# Patient Record
Sex: MALE | Race: WHITE | NOT HISPANIC OR LATINO | Employment: FULL TIME | URBAN - METROPOLITAN AREA
[De-identification: names, ages, dates, MRNs, and addresses within clinical notes are randomized per-mention and may not be internally consistent; named-entity substitution may affect disease eponyms.]

---
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
## 2023-07-19 ENCOUNTER — HOSPITAL ENCOUNTER (EMERGENCY)
Facility: CLINIC | Age: 23
Discharge: HOME | End: 2023-07-19
Payer: COMMERCIAL

## 2023-07-19 VITALS
SYSTOLIC BLOOD PRESSURE: 136 MMHG | DIASTOLIC BLOOD PRESSURE: 74 MMHG | TEMPERATURE: 97.7 F | RESPIRATION RATE: 16 BRPM | HEART RATE: 81 BPM | OXYGEN SATURATION: 98 %

## 2023-07-19 DIAGNOSIS — J06.9 VIRAL URI WITH COUGH: ICD-10-CM

## 2023-07-19 LAB — GROUP A STREP BY PCR: NOT DETECTED

## 2023-07-19 PROCEDURE — 99203 OFFICE O/P NEW LOW 30 MIN: CPT

## 2023-07-19 PROCEDURE — G0463 HOSPITAL OUTPT CLINIC VISIT: HCPCS

## 2023-07-19 PROCEDURE — 87651 STREP A DNA AMP PROBE: CPT

## 2023-07-19 ASSESSMENT — ACTIVITIES OF DAILY LIVING (ADL): ADLS_ACUITY_SCORE: 35

## 2023-07-19 NOTE — ED PROVIDER NOTES
Chief Complaint:   Chief Complaint   Patient presents with     Pharyngitis     Last Thursday     Cough     Mainly dry.     Headache         HPI:     Roge Bradley is a 23 year old male who presents to the UC with a 5 day history of sore throat.  He has also had Cough for 5 day(s), intermittent mild headaches, and Malaise.  He has not had Recent exposure to Strep, Fever, shortness of breath, Rash, Nausea, Vomitting, Diarrhea.  He has tried acetaminophen with mild relief of symptoms.  Denies other new concerns today.     Medications:   Current Outpatient Medications   Medication Sig Dispense Refill     FLOVENT  MCG/ACT inhaler        NO ACTIVE MEDICATIONS          Allergies:   No Known Allergies    Medications updated and reviewed.  Past, family and surgical history is updated and reviewed in the record.     Review of Systems:  General: see HPI  HENT: see HPI  Skin: see HPI    Physical Exam:   /74   Pulse 81   Temp 97.7  F (36.5  C) (Tympanic)   Resp 16   SpO2 98%    General: Patient is well nourished, well developed, well groomed and in no acute distress  Ears: negative  Nose: no drainage.  Mouth/Throat: bilateral adenopathy and erythematous.  Trismus is not present. Muffled voice is not present. Uvular shift is not present.   Neck: Neck supple. No adenopathy.   Chest/Pulmonary: normal and clear to auscultation  Cardiac: S1S2, RRR, No murmur      Medical Decision Making:  Sore throat with no exam findings to suggest peritonsillar abscess.  Strep testing by PCR negative.    Assessment:  Viral URI    Plan:   We will treat symptoms of pharyngitis and antibiotics are not indicated.    He was advised to gargle with warm salt water 4 times a day and try to drink as much fluid as possible. Use acetaminophen, ibuprofen for discomfort. Can also use other OTC cough and cold medications for symptomatic relief. Return to the ER/UC with increased pain, inability to swallow fluids, fever, rash or any  concerns.     Condition on disposition: Stable      Disclaimer:  This note consists of symbols derived from keyboarding, dictation and/or voice recognition software.  As a result, there may be errors in the script that have gone undetected.  Please consider this when interpreting information found in this chart.         Kyler Mcguire APRN CNP  07/19/23 1514

## 2023-07-19 NOTE — DISCHARGE INSTRUCTIONS
Return for new or worsening symptoms. Continue with over the counter treatments such as tylenol and ibuprofen for sore throat. Below are some additional over the counter cough and cold medications that may be helpful:    For cough, dextromethorphan/guaifenesin combinations help loosen secretions and suppress cough safely without significant risk of sedation.      For nasal congestion and sinus pressure, pseudoephedrine (Sudafed) or phenylephrine is often helpful but it can cause elevations in blood pressure and insomnia.  Short courses of a nasal decongestant spray (Afrin or Neosinephrine) can be appropriate but their use should be restricted to 3 days due to the high risk of rebound congestion.  Use Coricidin as a decongestant if you have a history of hypertension.     For pain and fevers, acetaminophen (Tylenol) is most appropriate.  Ibuprofen (Advil) or naproxen (Aleve) are useful too and last longer but they can cause elevation of blood pressure or stomach problems.     Sometimes the cause of your sinusitis is from allergies.  You may want to try taking a daily antihistamines such as Claritin, Zyrtec, or Allegra - all over the counter medications.

## 2024-12-29 ENCOUNTER — TELEPHONE (OUTPATIENT)
Dept: NURSING | Facility: CLINIC | Age: 24
End: 2024-12-29
Payer: COMMERCIAL

## 2024-12-29 ENCOUNTER — HOSPITAL ENCOUNTER (EMERGENCY)
Facility: CLINIC | Age: 24
Discharge: HOME | End: 2024-12-29
Payer: COMMERCIAL

## 2024-12-29 ENCOUNTER — APPOINTMENT (OUTPATIENT)
Dept: GENERAL RADIOLOGY | Facility: CLINIC | Age: 24
End: 2024-12-29
Payer: COMMERCIAL

## 2024-12-29 VITALS
RESPIRATION RATE: 16 BRPM | TEMPERATURE: 97.4 F | DIASTOLIC BLOOD PRESSURE: 70 MMHG | OXYGEN SATURATION: 98 % | SYSTOLIC BLOOD PRESSURE: 137 MMHG | HEART RATE: 97 BPM

## 2024-12-29 DIAGNOSIS — R05.2 SUBACUTE COUGH: ICD-10-CM

## 2024-12-29 PROCEDURE — 99214 OFFICE O/P EST MOD 30 MIN: CPT

## 2024-12-29 PROCEDURE — G0463 HOSPITAL OUTPT CLINIC VISIT: HCPCS | Mod: 25

## 2024-12-29 PROCEDURE — 71046 X-RAY EXAM CHEST 2 VIEWS: CPT

## 2024-12-29 RX ORDER — BENZONATATE 100 MG/1
200 CAPSULE ORAL 3 TIMES DAILY PRN
Qty: 30 CAPSULE | Refills: 0 | Status: SHIPPED | OUTPATIENT
Start: 2024-12-29

## 2024-12-29 ASSESSMENT — COLUMBIA-SUICIDE SEVERITY RATING SCALE - C-SSRS
1. IN THE PAST MONTH, HAVE YOU WISHED YOU WERE DEAD OR WISHED YOU COULD GO TO SLEEP AND NOT WAKE UP?: NO
6. HAVE YOU EVER DONE ANYTHING, STARTED TO DO ANYTHING, OR PREPARED TO DO ANYTHING TO END YOUR LIFE?: NO
2. HAVE YOU ACTUALLY HAD ANY THOUGHTS OF KILLING YOURSELF IN THE PAST MONTH?: NO

## 2024-12-29 NOTE — ED TRIAGE NOTES
Sick since the beginning of the month everything else got better but the cough is lingering.  Leydi Oliver MA

## 2024-12-29 NOTE — ED PROVIDER NOTES
Chief Complaint:   Chief Complaint   Patient presents with    Cough         HPI:   Roge Bradley is a 24 year old male who presents to  for evaluation of ongoing dry cough. Symptoms initially began at the beginning of the month and was accompanied by pharyngitis, nasal congestion, subjective fevers, the symptoms resolved about a week later which is when the cough began.  Initially cough was productive with purulent sputum and in the last week has become drier and more persistent.  Is not necessarily worse but is not improving.  He has not tried anything to help with the cough as of yet.  He denies aching, chest congestion, chest pain, decreased appetite, decreased urine output, diarrhea, dizziness, ear pressure, facial pain/pressure, fatigue, fever, headache, nausea, shortness of breath, sore throat, swollen glands, vomiting, and wheezing.      Meds:   Current Outpatient Medications   Medication Sig Dispense Refill    benzonatate (TESSALON) 100 MG capsule Take 2 capsules (200 mg) by mouth 3 times daily as needed for cough. 30 capsule 0    FLOVENT  MCG/ACT inhaler       NO ACTIVE MEDICATIONS          Allergies:   No Known Allergies    Medications updated and reviewed.  Past, family and surgical history is updated and reviewed in the record.     Review of Systems:  Pertinent review of systems as documented per HPI above.    Physical Exam:   /70   Pulse 97   Temp 97.4  F (36.3  C) (Tympanic)   Resp 16   SpO2 98%    General: alert and no acute distress  Eyes: negative, conjunctivae not injected /corneas clear. PERRL, EOM's intact.  Ears: negative, External ears normal. Canals clear. TM's normal.  Nose: Nares normal and no rhinorrhea  Mouth/Throat: moist mucus membranes, NORMAL - no erythema, no adenopathy, no exudates.  Neck: Neck supple, no adenopathy  Chest/Pulmonary: CTAB without wheezes, rales, or rhonchi. No signs of respiratory distress.  Cardiovascular: RRR normal S1S2  Abdomen: Bowel  sounds normoactive, abdomen soft without tenderness, guarding or rigidity. No HSM.   Skin:  Skin color, texture, turgor normal. No rashes or lesions.    Results for orders placed or performed during the hospital encounter of 12/29/24 (from the past 24 hours)   XR Chest 2 Views    Narrative    EXAM: XR CHEST 2 VIEWS  LOCATION: [facility]  DATE: 12/29/2024    INDICATION: ongoing cough, eval for pneumonia  COMPARISON: None.      Impression    IMPRESSION: The cardiac silhouette and pulmonary vasculature are within normal limits. No focal pulmonary consolidations. No pleural effusion or pneumothorax.       Assessment:  Subacute cough    Plan:   24-year-old male who presents for evaluation of ongoing dry cough present for the past month.  Initially was accompanied by nasal congestion, pharyngitis and fevers which have since resolved.  Has not been trying anything for his cough.  Denies associated chest pain or tightness, shortness of breath or trouble with breathing, fatigue, lightheadedness or weakness.    Patient well-appearing on arrival with VSS.  Afebrile.  Satting well at 98% with a normal respiratory rate.  Examination above is overall reassuring, lungs are clear without associated wheezing, stridor, rales or rhonchi.  There are no signs of respiratory distress.  Given the duration of the cough, chest x-ray was ordered to further evaluate.  This was independently reviewed and without signs of acute cardiopulmonary disease including pneumonia.  I did send Tessalon capsules to aid with the cough and we discussed other supportive cares that can help. Advised that if symptoms are not improving despite this treatment plan, then they should follow-up with primary provider for reevaluation. Strict UC/ED return precautions discussed in detail including prolonged fevers, development of shortness of breath or trouble with breathing, weakness or lightheadedness, inability to tolerate oral intake or  anything else that is concerning to them. All questions were answered. Pt verbalized understanding and agreement with the above plan.     Note: Patient did leave following the chest x-ray with the plan to discuss results following radiology interpretation.  I attempted to call patient at primary phone number listed in chart multiple times to discuss chest X-ray results and was unable to reach the patient. Voicemail was left for him.     Condition on disposition: Stable    Disclaimer: This note consists of symbols derived from keyboarding, dictation, and/or voice recognition software. As a result, there may be errors in the script that have gone undetected.  Please consider this when interpreting information found in the chart.         Chikis Pelletier PA-C  12/30/24 [id number]

## 2024-12-29 NOTE — TELEPHONE ENCOUNTER
Patient and his dad calling. Patient gives permission to speak with his dad.     Patient's dad was sent an email about test results for himself, but dad wasn't seen, patient was.     Chest x-ray results have not been reviewed by a provider at this time.     Patient states understanding, and that he will wait for a call regarding his results. No triage.    Elisa Peterson RN  [facility]  December 29, 2024, 5:39 PM

## 2024-12-30 NOTE — TELEPHONE ENCOUNTER
Patient calling back. He received a call from the clinic, and he was unable to answer the call.     Xray results have not been reviewed by a provider. He received two calls, but no message were left for him.     Patient was informed that if there were any serious concerns, there would have been messages left to return the calls. Patient stated understanding. No triage.     Elisa Peterson RN  [facility]  December 29, 2024, 10:59 PM